# Patient Record
Sex: FEMALE | Race: WHITE | NOT HISPANIC OR LATINO
[De-identification: names, ages, dates, MRNs, and addresses within clinical notes are randomized per-mention and may not be internally consistent; named-entity substitution may affect disease eponyms.]

---

## 2018-11-02 ENCOUNTER — RESULT REVIEW (OUTPATIENT)
Age: 28
End: 2018-11-02

## 2021-05-12 ENCOUNTER — RESULT REVIEW (OUTPATIENT)
Age: 31
End: 2021-05-12

## 2024-04-29 ENCOUNTER — INPATIENT (INPATIENT)
Facility: HOSPITAL | Age: 34
LOS: 2 days | Discharge: HOME | DRG: 833 | End: 2024-05-02
Payer: MEDICAID

## 2024-04-29 ENCOUNTER — OUTPATIENT (OUTPATIENT)
Dept: INPATIENT UNIT | Facility: HOSPITAL | Age: 34
LOS: 1 days | End: 2024-04-29
Payer: MEDICAID

## 2024-04-29 VITALS — SYSTOLIC BLOOD PRESSURE: 114 MMHG | DIASTOLIC BLOOD PRESSURE: 60 MMHG | HEART RATE: 81 BPM

## 2024-04-29 VITALS — DIASTOLIC BLOOD PRESSURE: 78 MMHG | SYSTOLIC BLOOD PRESSURE: 128 MMHG | HEART RATE: 92 BPM

## 2024-04-29 VITALS
RESPIRATION RATE: 18 BRPM | DIASTOLIC BLOOD PRESSURE: 71 MMHG | TEMPERATURE: 99 F | SYSTOLIC BLOOD PRESSURE: 133 MMHG | HEART RATE: 101 BPM
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
## 2024-04-29 DIAGNOSIS — O26.899 OTHER SPECIFIED PREGNANCY RELATED CONDITIONS, UNSPECIFIED TRIMESTER: ICD-10-CM

## 2024-04-29 DIAGNOSIS — O26.893 OTHER SPECIFIED PREGNANCY RELATED CONDITIONS, THIRD TRIMESTER: ICD-10-CM

## 2024-04-29 LAB
BASOPHILS # BLD AUTO: 0.02 K/UL — SIGNIFICANT CHANGE UP (ref 0–0.2)
BASOPHILS NFR BLD AUTO: 0.1 % — SIGNIFICANT CHANGE UP (ref 0–2)
BLD GP AB SCN SERPL QL: SIGNIFICANT CHANGE UP
EOSINOPHIL # BLD AUTO: 0 K/UL — SIGNIFICANT CHANGE UP (ref 0–0.5)
EOSINOPHIL NFR BLD AUTO: 0 % — SIGNIFICANT CHANGE UP (ref 0–6)
HCT VFR BLD CALC: 29.8 % — LOW (ref 34.5–45)
HGB BLD-MCNC: 10.4 G/DL — LOW (ref 11.5–15.5)
IMM GRANULOCYTES NFR BLD AUTO: 1 % — HIGH (ref 0–0.9)
LYMPHOCYTES # BLD AUTO: 1.95 K/UL — SIGNIFICANT CHANGE UP (ref 1–3.3)
LYMPHOCYTES # BLD AUTO: 11.8 % — LOW (ref 13–44)
MCHC RBC-ENTMCNC: 31.9 PG — SIGNIFICANT CHANGE UP (ref 27–34)
MCHC RBC-ENTMCNC: 34.9 GM/DL — SIGNIFICANT CHANGE UP (ref 32–36)
MCV RBC AUTO: 91.4 FL — SIGNIFICANT CHANGE UP (ref 80–100)
MONOCYTES # BLD AUTO: 0.9 K/UL — SIGNIFICANT CHANGE UP (ref 0–0.9)
MONOCYTES NFR BLD AUTO: 5.5 % — SIGNIFICANT CHANGE UP (ref 2–14)
NEUTROPHILS # BLD AUTO: 13.46 K/UL — HIGH (ref 1.8–7.4)
NEUTROPHILS NFR BLD AUTO: 81.6 % — HIGH (ref 43–77)
PLATELET # BLD AUTO: 277 K/UL — SIGNIFICANT CHANGE UP (ref 150–400)
RBC # BLD: 3.26 M/UL — LOW (ref 3.8–5.2)
RBC # FLD: 13.1 % — SIGNIFICANT CHANGE UP (ref 10.3–14.5)
WBC # BLD: 16.49 K/UL — HIGH (ref 3.8–10.5)
WBC # FLD AUTO: 16.49 K/UL — HIGH (ref 3.8–10.5)

## 2024-04-29 PROCEDURE — G0463: CPT

## 2024-04-29 PROCEDURE — 59025 FETAL NON-STRESS TEST: CPT

## 2024-04-29 RX ORDER — INFLUENZA VIRUS VACCINE 15; 15; 15; 15 UG/.5ML; UG/.5ML; UG/.5ML; UG/.5ML
0.5 SUSPENSION INTRAMUSCULAR ONCE
Refills: 0 | Status: DISCONTINUED | OUTPATIENT
Start: 2024-04-29 | End: 2024-05-02

## 2024-04-29 RX ORDER — SODIUM CHLORIDE 9 MG/ML
1000 INJECTION, SOLUTION INTRAVENOUS
Refills: 0 | Status: DISCONTINUED | OUTPATIENT
Start: 2024-04-29 | End: 2024-04-30

## 2024-04-29 RX ORDER — CITRIC ACID/SODIUM CITRATE 300-500 MG
30 SOLUTION, ORAL ORAL ONCE
Refills: 0 | Status: COMPLETED | OUTPATIENT
Start: 2024-04-29 | End: 2024-04-29

## 2024-04-29 RX ORDER — CHLORHEXIDINE GLUCONATE 213 G/1000ML
1 SOLUTION TOPICAL DAILY
Refills: 0 | Status: DISCONTINUED | OUTPATIENT
Start: 2024-04-29 | End: 2024-04-30

## 2024-04-29 RX ORDER — DIPHENHYDRAMINE HCL 50 MG
25 CAPSULE ORAL ONCE
Refills: 0 | Status: COMPLETED | OUTPATIENT
Start: 2024-04-29 | End: 2024-04-29

## 2024-04-29 RX ORDER — OXYTOCIN 10 UNIT/ML
333.33 VIAL (ML) INJECTION
Qty: 20 | Refills: 0 | Status: COMPLETED | OUTPATIENT
Start: 2024-04-29 | End: 2024-04-29

## 2024-04-29 RX ORDER — FAMOTIDINE 10 MG/ML
20 INJECTION INTRAVENOUS ONCE
Refills: 0 | Status: COMPLETED | OUTPATIENT
Start: 2024-04-29 | End: 2024-04-29

## 2024-04-29 RX ORDER — ACETAMINOPHEN 500 MG
975 TABLET ORAL ONCE
Refills: 0 | Status: COMPLETED | OUTPATIENT
Start: 2024-04-29 | End: 2024-04-29

## 2024-04-29 RX ORDER — SODIUM CHLORIDE 9 MG/ML
1000 INJECTION, SOLUTION INTRAVENOUS ONCE
Refills: 0 | Status: COMPLETED | OUTPATIENT
Start: 2024-04-29 | End: 2024-04-29

## 2024-04-29 RX ADMIN — Medication 975 MILLIGRAM(S): at 07:53

## 2024-04-29 RX ADMIN — SODIUM CHLORIDE 125 MILLILITER(S): 9 INJECTION, SOLUTION INTRAVENOUS at 16:34

## 2024-04-29 RX ADMIN — Medication 25 MILLIGRAM(S): at 19:16

## 2024-04-29 RX ADMIN — SODIUM CHLORIDE 1000 MILLILITER(S): 9 INJECTION, SOLUTION INTRAVENOUS at 07:53

## 2024-04-29 RX ADMIN — FAMOTIDINE 20 MILLIGRAM(S): 10 INJECTION INTRAVENOUS at 22:32

## 2024-04-29 RX ADMIN — Medication 30 MILLILITER(S): at 18:14

## 2024-04-29 RX ADMIN — SODIUM CHLORIDE 125 MILLILITER(S): 9 INJECTION, SOLUTION INTRAVENOUS at 19:12

## 2024-04-29 NOTE — OB PROVIDER H&P - NSLOWPPHRISK_OBGYN_A_OB
No previous uterine incision/[place] Pregnancy/Less than or equal to 4 previous vaginal births/No known bleeding disorder/No history of postpartum hemorrhage/No other PPH risks indicated

## 2024-04-29 NOTE — OB RN TRIAGE NOTE - FALL HARM RISK - UNIVERSAL INTERVENTIONS
Bed in lowest position, wheels locked, appropriate side rails in place/Call bell, personal items and telephone in reach/Instruct patient to call for assistance before getting out of bed or chair/Non-slip footwear when patient is out of bed/[place] to call system/Physically safe environment - no spills, clutter or unnecessary equipment/Purposeful Proactive Rounding/Room/bathroom lighting operational, light cord in reach

## 2024-04-29 NOTE — OB PROVIDER H&P - HISTORY OF PRESENT ILLNESS
33 yr old  EDC 24 at 38.2 weeks gestation C/O contractions every 5min, severe in intensity.  She states the CTXs started at 9am yesterday and have been increasing in frequency and severity since that time.  She was seen and evaluated in LDR this am at 6:30am, cervix was FT at that time.  She has been ambulating at home and now cannot tolerate the level of pain, wants an epidural.  No LOF, no VB, +FM.  On exam on arrival, cervix 2-3cm dilated, 60% effaced, -2 station.  Pregnancy has been uncomplicated.

## 2024-04-29 NOTE — OB PROVIDER H&P - ASSESSMENT
33 yr old  EDC 24 at 38.2 weeks gestation C/O contractions every 5min, severe in intensity.  She states the CTXs started at 9am yesterday and have been increasing in frequency and severity since that time.  She was seen and evaluated in LDR this morning at 6:30am, cervix was FT at that time.  She has been ambulating at home and now cannot tolerate the level of pain, wants an epidural.  No LOF, no VB, +FM.  On exam on arrival, cervix 2-3cm dilated, 60% effaced, -2 station.  Pregnancy has been uncomplicated.

## 2024-04-29 NOTE — OB PROVIDER LABOR PROGRESS NOTE - ASSESSMENT
Exam as above, anterior cervix noted to be swollen  FSE in place from prior  Will consult anesthesia for epidural top off    Cont Expt mgmt

## 2024-04-30 ENCOUNTER — TRANSCRIPTION ENCOUNTER (OUTPATIENT)
Age: 34
End: 2024-04-30

## 2024-04-30 LAB — T PALLIDUM AB TITR SER: NEGATIVE — SIGNIFICANT CHANGE UP

## 2024-04-30 RX ORDER — TETANUS TOXOID, REDUCED DIPHTHERIA TOXOID AND ACELLULAR PERTUSSIS VACCINE, ADSORBED 5; 2.5; 8; 8; 2.5 [IU]/.5ML; [IU]/.5ML; UG/.5ML; UG/.5ML; UG/.5ML
0.5 SUSPENSION INTRAMUSCULAR ONCE
Refills: 0 | Status: DISCONTINUED | OUTPATIENT
Start: 2024-04-30 | End: 2024-05-02

## 2024-04-30 RX ORDER — IBUPROFEN 200 MG
1 TABLET ORAL
Qty: 28 | Refills: 0
Start: 2024-04-30 | End: 2024-05-06

## 2024-04-30 RX ORDER — DIBUCAINE 1 %
1 OINTMENT (GRAM) RECTAL EVERY 6 HOURS
Refills: 0 | Status: DISCONTINUED | OUTPATIENT
Start: 2024-04-30 | End: 2024-05-02

## 2024-04-30 RX ORDER — DIPHENHYDRAMINE HCL 50 MG
25 CAPSULE ORAL EVERY 6 HOURS
Refills: 0 | Status: DISCONTINUED | OUTPATIENT
Start: 2024-04-30 | End: 2024-05-02

## 2024-04-30 RX ORDER — SODIUM CHLORIDE 9 MG/ML
3 INJECTION INTRAMUSCULAR; INTRAVENOUS; SUBCUTANEOUS EVERY 8 HOURS
Refills: 0 | Status: DISCONTINUED | OUTPATIENT
Start: 2024-04-30 | End: 2024-05-02

## 2024-04-30 RX ORDER — BENZOCAINE 10 %
1 GEL (GRAM) MUCOUS MEMBRANE EVERY 6 HOURS
Refills: 0 | Status: DISCONTINUED | OUTPATIENT
Start: 2024-04-30 | End: 2024-05-02

## 2024-04-30 RX ORDER — ACETAMINOPHEN 500 MG
975 TABLET ORAL
Refills: 0 | Status: DISCONTINUED | OUTPATIENT
Start: 2024-04-30 | End: 2024-05-02

## 2024-04-30 RX ORDER — AER TRAVELER 0.5 G/1
1 SOLUTION RECTAL; TOPICAL EVERY 4 HOURS
Refills: 0 | Status: DISCONTINUED | OUTPATIENT
Start: 2024-04-30 | End: 2024-05-02

## 2024-04-30 RX ORDER — IBUPROFEN 200 MG
600 TABLET ORAL EVERY 6 HOURS
Refills: 0 | Status: DISCONTINUED | OUTPATIENT
Start: 2024-04-30 | End: 2024-05-02

## 2024-04-30 RX ORDER — ACETAMINOPHEN 500 MG
3 TABLET ORAL
Qty: 84 | Refills: 0
Start: 2024-04-30 | End: 2024-05-06

## 2024-04-30 RX ORDER — SIMETHICONE 80 MG/1
80 TABLET, CHEWABLE ORAL EVERY 4 HOURS
Refills: 0 | Status: DISCONTINUED | OUTPATIENT
Start: 2024-04-30 | End: 2024-05-02

## 2024-04-30 RX ORDER — KETOROLAC TROMETHAMINE 30 MG/ML
30 SYRINGE (ML) INJECTION ONCE
Refills: 0 | Status: DISCONTINUED | OUTPATIENT
Start: 2024-04-30 | End: 2024-04-30

## 2024-04-30 RX ORDER — IBUPROFEN 200 MG
600 TABLET ORAL EVERY 6 HOURS
Refills: 0 | Status: COMPLETED | OUTPATIENT
Start: 2024-04-30 | End: 2025-03-29

## 2024-04-30 RX ORDER — HYDROCORTISONE 1 %
1 OINTMENT (GRAM) TOPICAL EVERY 6 HOURS
Refills: 0 | Status: DISCONTINUED | OUTPATIENT
Start: 2024-04-30 | End: 2024-05-02

## 2024-04-30 RX ORDER — PRAMOXINE HYDROCHLORIDE 150 MG/15G
1 AEROSOL, FOAM RECTAL EVERY 4 HOURS
Refills: 0 | Status: DISCONTINUED | OUTPATIENT
Start: 2024-04-30 | End: 2024-05-02

## 2024-04-30 RX ORDER — OXYTOCIN 10 UNIT/ML
41.67 VIAL (ML) INJECTION
Qty: 20 | Refills: 0 | Status: DISCONTINUED | OUTPATIENT
Start: 2024-04-30 | End: 2024-05-02

## 2024-04-30 RX ORDER — MAGNESIUM HYDROXIDE 400 MG/1
30 TABLET, CHEWABLE ORAL
Refills: 0 | Status: DISCONTINUED | OUTPATIENT
Start: 2024-04-30 | End: 2024-05-02

## 2024-04-30 RX ORDER — LANOLIN
1 OINTMENT (GRAM) TOPICAL EVERY 6 HOURS
Refills: 0 | Status: DISCONTINUED | OUTPATIENT
Start: 2024-04-30 | End: 2024-05-02

## 2024-04-30 RX ADMIN — SODIUM CHLORIDE 3 MILLILITER(S): 9 INJECTION INTRAMUSCULAR; INTRAVENOUS; SUBCUTANEOUS at 22:00

## 2024-04-30 RX ADMIN — Medication 1000 MILLIUNIT(S)/MIN: at 01:03

## 2024-04-30 RX ADMIN — Medication 30 MILLIGRAM(S): at 01:40

## 2024-04-30 RX ADMIN — Medication 975 MILLIGRAM(S): at 21:13

## 2024-04-30 RX ADMIN — Medication 975 MILLIGRAM(S): at 15:30

## 2024-04-30 RX ADMIN — Medication 975 MILLIGRAM(S): at 15:03

## 2024-04-30 RX ADMIN — Medication 1 TABLET(S): at 12:07

## 2024-04-30 RX ADMIN — SODIUM CHLORIDE 3 MILLILITER(S): 9 INJECTION INTRAMUSCULAR; INTRAVENOUS; SUBCUTANEOUS at 06:00

## 2024-04-30 RX ADMIN — Medication 600 MILLIGRAM(S): at 19:13

## 2024-04-30 RX ADMIN — Medication 600 MILLIGRAM(S): at 12:07

## 2024-04-30 RX ADMIN — Medication 600 MILLIGRAM(S): at 17:57

## 2024-04-30 RX ADMIN — Medication 975 MILLIGRAM(S): at 09:10

## 2024-04-30 RX ADMIN — SODIUM CHLORIDE 125 MILLILITER(S): 9 INJECTION, SOLUTION INTRAVENOUS at 00:00

## 2024-04-30 RX ADMIN — SODIUM CHLORIDE 3 MILLILITER(S): 9 INJECTION INTRAMUSCULAR; INTRAVENOUS; SUBCUTANEOUS at 12:14

## 2024-04-30 RX ADMIN — Medication 975 MILLIGRAM(S): at 09:47

## 2024-04-30 RX ADMIN — Medication 975 MILLIGRAM(S): at 03:34

## 2024-04-30 RX ADMIN — Medication 600 MILLIGRAM(S): at 12:14

## 2024-04-30 NOTE — PROGRESS NOTE PEDS - SUBJECTIVE AND OBJECTIVE BOX
33y Female s/p labor epidural on 04/29/2024    Vital Signs     T(C): 36.4 (30 Apr 2024 09:15), Max: 37.9 (30 Apr 2024 00:00)  T(F): 97.5 (30 Apr 2024 09:15), Max: 100.22 (30 Apr 2024 00:00)  HR: 71 (30 Apr 2024 09:15) (64 - 99)  BP: 121/74 (30 Apr 2024 09:15) (103/66 - 142/80)  BP(mean): --  RR: 16 (30 Apr 2024 09:15) (16 - 18)  SpO2: 98% (30 Apr 2024 09:15) (90% - 100%)    Parameters below as of 30 Apr 2024 09:15    Patient On (Oxygen Delivery Method): room air            Patient's overall anesthesia satisfaction: Positive    Patient seen and doing well     No headache      No residual numbness or weakness, sensory and motor function intact    No anesthetic complications or complaints noted or reported

## 2024-04-30 NOTE — DISCHARGE NOTE OB - CARE PROVIDER_API CALL
Shanice Gaines  Obstetrics and Gynecology  [address] [place] [id number]  Phone: [phone]  Fax: [phone]  Established Patient  Follow Up Time: 2 weeks

## 2024-04-30 NOTE — DISCHARGE NOTE OB - PATIENT PORTAL LINK FT
You can access the FollowMyHealth Patient Portal offered by [facility] by registering at the following website: [email or web address]. By joining [email or web address]’s FollowMyHealth portal, you will also be able to view your health information using other applications (apps) compatible with our system.

## 2024-04-30 NOTE — DISCHARGE NOTE OB - HOSPITAL COURSE
Patient underwent a normal spontaneous vaginal delivery. Post-partum course was uncomplicated. Pain is well controlled with PRN medication. She has no difficulty with ambulation, voiding, or PO intake. Lab values and vital signs are within normal limits prior to discharge.   Patient underwent a normal spontaneous vaginal delivery at 38w2d due to labor. Post-partum course was uncomplicated. Pain is well controlled with PRN medication. She has no difficulty with ambulation, voiding, or PO intake. Lab values and vital signs are within normal limits prior to discharge.

## 2024-04-30 NOTE — OB RN DELIVERY SUMMARY - NS_SEPSISRSKCALC_OBGYN_ALL_OB_FT
EOS calculated successfully. EOS Risk Factor: 0.5/1000 live births ([facility] national incidence); GA=38w3d; Temp=100.22; ROM=9.25; GBS='Negative'; Antibiotics='No antibiotics or any antibiotics < 2 hrs prior to birth'

## 2024-04-30 NOTE — OB PROVIDER DELIVERY SUMMARY - NSPROVIDERDELIVERYNOTE_OBGYN_ALL_OB_FT
Patient noted to be fully dilated, and after a period of pushing, patient was prepped and draped for delivery. In conjunction with maternal effort, she delivered a viable female infant. Head delivered OA with meconium stained fluid noted, tight nuchal cord x2 clamped and cut at perineum. Cord blood collected for blood gas and G6PD deficiency testing. Placenta delivered spontaneously and intact, no gross pathology noted. Perineum and vagina were inspected and a 1st degree perineal laceration was noted and repaired with 3-0 chromic. Excellent hemostasis obtained.    EBL 131cc  Sex: F, Delivery Time: 0100,  Weight: 2700g, APGARs: 9/9 Patient noted to be fully dilated, and after a period of pushing, patient was prepped and draped for delivery. In conjunction with maternal effort, she delivered a viable female infant. Head delivered OA with meconium stained fluid noted, tight nuchal cord x2 clamped and cut at perineum. Segment of cord clamped and cut for cord blood gases and G6PD deficiency testing. Placenta delivered spontaneously and intact, no gross pathology noted. 3-vessel cord.  Perineum and vagina were inspected and a 1st degree perineal laceration was noted and repaired with 2-0 chromic. Excellent hemostasis obtained.    EBL 131cc  Sex: F, Delivery Time: 0100,  Weight: 2700g, APGARs: 9/9 Patient noted to be fully dilated, and after a period of pushing, patient was prepped and draped for delivery. In conjunction with maternal effort, she delivered a viable female infant. Head delivered OA with meconium stained fluid noted, tight nuchal cord x2 clamped and cut at perineum. Neonatologist present for Cat 2 tracing and meconium-stained fluid.  Baby brought to the warmer after delivery for clearance by neonatologist, then brought to mother for skin-to-skin.  Segment of cord clamped and cut for cord blood gases and G6PD deficiency testing. Placenta delivered spontaneously and intact, no gross pathology noted. 3-vessel cord.  Perineum and vagina were inspected and a 1st degree perineal laceration was noted and repaired with 2-0 chromic. Excellent hemostasis obtained.    EBL 131cc  Sex: F, Delivery Time: 0100, Mylo Weight: 2700g, APGARs: 9/9

## 2024-04-30 NOTE — OB RN DELIVERY SUMMARY - NS_NEWBORNAALIVE_OBGYN_ALL_OB
This medication needs to come from endo.  I will FWD to her endo to see if he is comfortable feeling and to help get her back into his office.  Please notify the patient as well.  
Was the patient seen in the last year in this department? No     Does patient have an active prescription for medications requested? No     Received Request Via: Pharmacy  
Yes

## 2024-04-30 NOTE — OB NEONATOLOGY/PEDIATRICIAN DELIVERY SUMMARY - NSPEDSNEONOTESA_OBGYN_ALL_OB_FT
Called by Dr. Gaines to delivery for NRFHT. Baby Girl Toby born at 38.3 via  to a 34yo  A+,  Hep B neg, HIV NR, RPR NR, Rubella Imm, GBS negative mother. No significant maternal hx or prenatal complications. AROM clear fluid ~10 hours prior to delivery. Highest maternal temp 37.8. Tight nuchal x2 noted at delivery. Baby emerged stunned, but with appropriate tone. Warmed, dried, suctioned, stimulated. Responded well to initial resuscitative efforts. Apgar 9/9. Admit to  nursery. EOS 0.61

## 2024-04-30 NOTE — OB RN DELIVERY SUMMARY - NS_GENERALBABYACOMMENTA_OBGYN_ALL_OB_FT
Pt transferred via WC to PEDS by ALEXYS Penny, baby transferred to PEDS at same time as mother by this RN via crib

## 2024-04-30 NOTE — OB RN DELIVERY SUMMARY - NSSELHIDDEN_OBGYN_ALL_OB_FT
[[email or web address]_OBGYN_ALL_OB_FT:[id number]==],[NS_DeliveryRN_OBGYN_ALL_OB_FT:[id number]]

## 2024-04-30 NOTE — DISCHARGE NOTE OB - PLAN OF CARE
1) Please take ibuprofen and/or Tylenol as needed for pain as prescribed.  2) Nothing in the vagina for 6 weeks (including no sex, no tampons, and no douching).  3) Please call your doctor for a follow up your postpartum appointment in 4-6 weeks.  4) Please continue taking vitamins postpartum.   5) Please call the office sooner if you have heavy vaginal bleeding, severe abdominal pain, or fever > 100.4F.  6) You may resume regular daily activity as tolerated Patient with mild anemia secondary to acute blood loss with delivery, stable. Patient should follow with OB  at regular postpartum check, and to call doctor sooner if develop symptoms of anemia such as shortness of breath, lightheadedness, or fainting. If medication such as ferrous sulfate is prescribed, take as directed.

## 2024-04-30 NOTE — DISCHARGE NOTE OB - CARE PLAN
Principal Discharge DX:	Spontaneous vaginal delivery  Assessment and plan of treatment:	1) Please take ibuprofen and/or Tylenol as needed for pain as prescribed.  2) Nothing in the vagina for 6 weeks (including no sex, no tampons, and no douching).  3) Please call your doctor for a follow up your postpartum appointment in 4-6 weeks.  4) Please continue taking vitamins postpartum.   5) Please call the office sooner if you have heavy vaginal bleeding, severe abdominal pain, or fever > 100.4F.  6) You may resume regular daily activity as tolerated  Secondary Diagnosis:	Anemia due to acute blood loss  Assessment and plan of treatment:	Patient with mild anemia secondary to acute blood loss with delivery, stable. Patient should follow with OB  at regular postpartum check, and to call doctor sooner if develop symptoms of anemia such as shortness of breath, lightheadedness, or fainting. If medication such as ferrous sulfate is prescribed, take as directed.   1

## 2024-05-01 LAB
HCT VFR BLD CALC: 25.5 % — LOW (ref 34.5–45)
HGB BLD-MCNC: 8.6 G/DL — LOW (ref 11.5–15.5)

## 2024-05-01 RX ADMIN — Medication 600 MILLIGRAM(S): at 23:37

## 2024-05-01 RX ADMIN — Medication 600 MILLIGRAM(S): at 18:14

## 2024-05-01 RX ADMIN — Medication 975 MILLIGRAM(S): at 15:31

## 2024-05-01 RX ADMIN — Medication 975 MILLIGRAM(S): at 15:25

## 2024-05-01 RX ADMIN — Medication 975 MILLIGRAM(S): at 20:03

## 2024-05-01 RX ADMIN — Medication 600 MILLIGRAM(S): at 12:44

## 2024-05-01 RX ADMIN — Medication 975 MILLIGRAM(S): at 09:06

## 2024-05-01 RX ADMIN — Medication 975 MILLIGRAM(S): at 09:24

## 2024-05-01 RX ADMIN — Medication 600 MILLIGRAM(S): at 12:37

## 2024-05-01 RX ADMIN — Medication 600 MILLIGRAM(S): at 06:06

## 2024-05-01 RX ADMIN — Medication 600 MILLIGRAM(S): at 18:10

## 2024-05-01 RX ADMIN — Medication 600 MILLIGRAM(S): at 00:28

## 2024-05-01 RX ADMIN — Medication 1 TABLET(S): at 12:37

## 2024-05-01 RX ADMIN — Medication 975 MILLIGRAM(S): at 03:09

## 2024-05-01 NOTE — PROGRESS NOTE ADULT - ASSESSMENT
ASSESSMENT:   LEONA THAO is a 33y  PPD1 s/p  at 37w6d due to labor.   Patient has no other complaints at this time, is otherwise clinically and hemodynamically stable.   [place] girl is with patient at bedside  Hgb: 12.9 > am lab    #Postpartum  - Continue routine post-partum care  - Pain management PRN  - Encourage maternal- bonding    - Diet: Regular, advance as tolerated  - DVT ppx: Ambulation encouraged  - Dispo: Home PPD2 per attending's approval

## 2024-05-01 NOTE — PROGRESS NOTE ADULT - SUBJECTIVE AND OBJECTIVE BOX
SUBJECTIVE:  Patient seen and examined at bedside this morning. No acute events overnight. Reports feeling well this morning, pain is well controlled with PRN pain medication.     OBJECTIVE:  Vital Signs Last 24 Hrs  T(C): 37.1 (30 Apr 2024 19:53), Max: 37.1 (30 Apr 2024 19:53)  T(F): 98.8 (30 Apr 2024 19:53), Max: 98.8 (30 Apr 2024 19:53)  HR: 70 (30 Apr 2024 19:53) (70 - 71)  BP: 119/74 (30 Apr 2024 19:53) (119/74 - 121/74)    Physical exam:  General: AOx3, NAD.  Lungs: Breathing comfortably on RA  Abdomen: +BS, Soft, appropriately tender, nondistended, no guarding or rebound tenderness, firm uterine fundus at umbilicus  Ext: BL LE reveal minimal swelling, no popliteal tenderness or skin changes.     LABS:                        10.4   16.49 )-----------( 277      ( 29 Apr 2024 14:46 )             29.8       Antibody Screen: NEG (04-29-24 @ 14:46)

## 2024-05-02 VITALS
SYSTOLIC BLOOD PRESSURE: 137 MMHG | OXYGEN SATURATION: 99 % | RESPIRATION RATE: 18 BRPM | DIASTOLIC BLOOD PRESSURE: 81 MMHG | TEMPERATURE: 99 F | HEART RATE: 55 BPM

## 2024-05-02 LAB
ALBUMIN SERPL ELPH-MCNC: 2.8 G/DL — LOW (ref 3.3–5.2)
ALP SERPL-CCNC: 94 U/L — SIGNIFICANT CHANGE UP (ref 40–120)
ALT FLD-CCNC: 29 U/L — SIGNIFICANT CHANGE UP
ANION GAP SERPL CALC-SCNC: 11 MMOL/L — SIGNIFICANT CHANGE UP (ref 5–17)
AST SERPL-CCNC: 32 U/L — HIGH
BILIRUB SERPL-MCNC: <0.2 MG/DL — LOW (ref 0.4–2)
BUN SERPL-MCNC: 8.6 MG/DL — SIGNIFICANT CHANGE UP (ref 8–20)
CALCIUM SERPL-MCNC: 8.2 MG/DL — LOW (ref 8.4–10.5)
CHLORIDE SERPL-SCNC: 102 MMOL/L — SIGNIFICANT CHANGE UP (ref 96–108)
CO2 SERPL-SCNC: 21 MMOL/L — LOW (ref 22–29)
CREAT SERPL-MCNC: 0.57 MG/DL — SIGNIFICANT CHANGE UP (ref 0.5–1.3)
EGFR: 123 ML/MIN/1.73M2 — SIGNIFICANT CHANGE UP
GLUCOSE SERPL-MCNC: 83 MG/DL — SIGNIFICANT CHANGE UP (ref 70–99)
HCT VFR BLD CALC: 24.7 % — LOW (ref 34.5–45)
HGB BLD-MCNC: 8.4 G/DL — LOW (ref 11.5–15.5)
MCHC RBC-ENTMCNC: 32.3 PG — SIGNIFICANT CHANGE UP (ref 27–34)
MCHC RBC-ENTMCNC: 34 GM/DL — SIGNIFICANT CHANGE UP (ref 32–36)
MCV RBC AUTO: 95 FL — SIGNIFICANT CHANGE UP (ref 80–100)
PLATELET # BLD AUTO: 244 K/UL — SIGNIFICANT CHANGE UP (ref 150–400)
POTASSIUM SERPL-MCNC: 3.4 MMOL/L — LOW (ref 3.5–5.3)
POTASSIUM SERPL-SCNC: 3.4 MMOL/L — LOW (ref 3.5–5.3)
PROT SERPL-MCNC: 4.9 G/DL — LOW (ref 6.6–8.7)
RBC # BLD: 2.6 M/UL — LOW (ref 3.8–5.2)
RBC # FLD: 13.5 % — SIGNIFICANT CHANGE UP (ref 10.3–14.5)
SODIUM SERPL-SCNC: 134 MMOL/L — LOW (ref 135–145)
WBC # BLD: 18.61 K/UL — HIGH (ref 3.8–10.5)
WBC # FLD AUTO: 18.61 K/UL — HIGH (ref 3.8–10.5)

## 2024-05-02 PROCEDURE — 86850 RBC ANTIBODY SCREEN: CPT

## 2024-05-02 PROCEDURE — 85018 HEMOGLOBIN: CPT

## 2024-05-02 PROCEDURE — 86780 TREPONEMA PALLIDUM: CPT

## 2024-05-02 PROCEDURE — 86900 BLOOD TYPING SEROLOGIC ABO: CPT

## 2024-05-02 PROCEDURE — 85025 COMPLETE CBC W/AUTO DIFF WBC: CPT

## 2024-05-02 PROCEDURE — 85014 HEMATOCRIT: CPT

## 2024-05-02 PROCEDURE — 59050 FETAL MONITOR W/REPORT: CPT

## 2024-05-02 PROCEDURE — 85027 COMPLETE CBC AUTOMATED: CPT

## 2024-05-02 PROCEDURE — 86901 BLOOD TYPING SEROLOGIC RH(D): CPT

## 2024-05-02 PROCEDURE — 80053 COMPREHEN METABOLIC PANEL: CPT

## 2024-05-02 PROCEDURE — 36415 COLL VENOUS BLD VENIPUNCTURE: CPT

## 2024-05-02 RX ADMIN — Medication 975 MILLIGRAM(S): at 09:22

## 2024-05-02 RX ADMIN — Medication 975 MILLIGRAM(S): at 03:42

## 2024-05-02 RX ADMIN — Medication 600 MILLIGRAM(S): at 05:55

## 2024-05-02 NOTE — PROGRESS NOTE ADULT - SUBJECTIVE AND OBJECTIVE BOX
SUBJECTIVE:  Patient seen and examined at bedside this morning. No acute events overnight. Denies any blurry vision or headaches today despite elevated BPs overnight.     OBJECTIVE:  Vital Signs Last 24 Hrs  T(C): 37 (02 May 2024 03:45), Max: 37 (01 May 2024 23:41)  T(F): 98.6 (02 May 2024 03:45), Max: 98.6 (01 May 2024 23:41)  HR: 69 (02 May 2024 03:45) (58 - 69)  BP: 136/77 (02 May 2024 03:45) (134/74 - 155/89)      Physical exam:  General: AOx3, NAD.  Lungs: Breathing comfortably on RA  Abdomen: +BS, Soft, appropriately tender, nondistended, no guarding or rebound tenderness, firm uterine fundus at umbilicus  Ext: BL LE reveal minimal swelling, no popliteal tenderness or skin changes.     LABS:                        10.4   16.49 )-----------( 277      ( 29 Apr 2024 14:46 )             29.8       Antibody Screen: NEG (04-29-24 @ 14:46)

## 2024-05-02 NOTE — PROGRESS NOTE ADULT - ATTENDING COMMENTS
Pt seen and examined.  She is now PPD#2 S/P  stable.  She did have elevated BPs during the night but she is normotensive since early this am and eager for D/C home.  OK for D/C home - she agrees to get a BP cuff and check at home.  Will have short term F/U in the office for BP monitoring.

## 2024-05-02 NOTE — PROGRESS NOTE ADULT - ASSESSMENT
ASSESSMENT:   LEONA THAO is a 33y  PPD1 s/p  at 37w6d due to labor.   Patient has no other complaints at this time, is otherwise clinically and hemodynamically stable.   [place] girl is with patient at bedside  Hgb: 10.4 > 8.6 > 8.4    #Postpartum  - Continue routine post-partum care  - Pain management PRN  - Encourage maternal- bonding    #Elevated BPs  - BPs 150s/90s yesterday evening, followed by 136/77  - Patient asymptomatic  - LFT 32/29, Plt 244  - Consider d/c Procardia 30mg    - Diet: Regular, advance as tolerated  - DVT ppx: Ambulation encouraged  - Dispo: Home PPD2 per attending's approval

## 2024-05-08 DIAGNOSIS — O99.343 OTHER MENTAL DISORDERS COMPLICATING PREGNANCY, THIRD TRIMESTER: ICD-10-CM

## 2024-05-08 DIAGNOSIS — F41.9 ANXIETY DISORDER, UNSPECIFIED: ICD-10-CM

## 2024-05-08 DIAGNOSIS — Z3A.38 38 WEEKS GESTATION OF PREGNANCY: ICD-10-CM

## 2024-08-02 ENCOUNTER — TRANSCRIPTION ENCOUNTER (OUTPATIENT)
Age: 34
End: 2024-08-02